# Patient Record
Sex: FEMALE | Race: WHITE | Employment: FULL TIME | URBAN - METROPOLITAN AREA
[De-identification: names, ages, dates, MRNs, and addresses within clinical notes are randomized per-mention and may not be internally consistent; named-entity substitution may affect disease eponyms.]

---

## 2022-03-18 PROBLEM — K57.90 DIVERTICULOSIS: Status: ACTIVE | Noted: 2019-11-15

## 2022-03-18 PROBLEM — Q79.60 EHLERS-DANLOS SYNDROME: Status: ACTIVE | Noted: 2019-11-15

## 2022-03-19 PROBLEM — E78.00 HIGH CHOLESTEROL: Status: ACTIVE | Noted: 2019-11-15

## 2022-03-20 PROBLEM — M16.12 OSTEOARTHRITIS OF LEFT HIP: Status: ACTIVE | Noted: 2019-11-15

## 2023-08-30 ENCOUNTER — HOSPITAL ENCOUNTER (OUTPATIENT)
Facility: HOSPITAL | Age: 58
Discharge: HOME | End: 2023-09-01
Payer: COMMERCIAL

## 2023-08-30 ENCOUNTER — HOSPITAL ENCOUNTER (OUTPATIENT)
Facility: HOSPITAL | Age: 58
Discharge: HOME | End: 2023-09-02

## 2023-08-30 LAB
EKG ATRIAL RATE: 51 BPM
EKG DIAGNOSIS: NORMAL
EKG P AXIS: 62 DEGREES
EKG P-R INTERVAL: 156 MS
EKG Q-T INTERVAL: 432 MS
EKG QRS DURATION: 82 MS
EKG QTC CALCULATION (BAZETT): 398 MS
EKG R AXIS: 74 DEGREES
EKG T AXIS: 71 DEGREES
EKG VENTRICULAR RATE: 51 BPM
SENTARA SPECIMEN COLLECTION: NORMAL

## 2023-08-30 PROCEDURE — 93005 ELECTROCARDIOGRAM TRACING: CPT | Performed by: ORTHOPAEDIC SURGERY

## 2023-09-08 PROBLEM — M16.11 PRIMARY OSTEOARTHRITIS OF RIGHT HIP: Chronic | Status: ACTIVE | Noted: 2023-09-08

## 2023-09-08 NOTE — H&P
veins.  EMOTIONAL:  Patient has no signs of anxiety, depression, bipolar disorder, memory loss or change in mood. Physical Exam:      There were no vitals taken for this visit. Physical Exam:   Physical exam shows a healthy-appearing, elderly white female. The right hip is tender at the greater trochanter and along the iliotibial band. In the MICAH position she gets pain that is similar to her complaints. The skin is normal with no contusions or wounds. There are no restrictions of hip mobility on internal and external rotation. The patient has normal light touch sensation in all dermatomal patterns. There is normal motor strength to heel and toe walking. There is negative straight leg raising bilaterally to 90 degrees in the seated position. The patient has good capillary refill. Assessment/Plan     Principal Problem:    Primary osteoarthritis of right hip  Resolved Problems:    * No resolved hospital problems. *     The patient is going to be scheduled for a right outpatient direct anterior hip arthroplasty using the Depuy Actis system under x-ray guidance. The risks including pain, bleeding, infection, fracture, deep vein thrombosis, pulmonary embolism were reviewed and questions were answered. The patient understands these risks and is willing to proceed. We have prescribed the patient Keflex for antibiotic prophylaxis and also a prescription for Roxicodone for postoperative pain management. We will use aspirin 81mg twice daily for DVT prophylaxis. The patient will also receive an IV dose of antibiotics preoperatively. The patient was counseled on the importance of ice and elevation. Home health PT & Nursing have been prescribed. The patient will follow up two weeks postoperatively. The patient was not issued a walker. Review of X-Rays show Kellgren-Ronnell grade 3/4 changes. We are going to do this at [facility].  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

## 2023-09-12 ENCOUNTER — ANESTHESIA EVENT (OUTPATIENT)
Facility: HOSPITAL | Age: 58
End: 2023-09-12
Payer: COMMERCIAL

## 2023-09-12 RX ORDER — SODIUM CHLORIDE 9 MG/ML
INJECTION, SOLUTION INTRAVENOUS PRN
Status: CANCELLED | OUTPATIENT
Start: 2023-09-12

## 2023-09-12 RX ORDER — SODIUM CHLORIDE 0.9 % (FLUSH) 0.9 %
5-40 SYRINGE (ML) INJECTION EVERY 12 HOURS SCHEDULED
Status: CANCELLED | OUTPATIENT
Start: 2023-09-12

## 2023-09-12 RX ORDER — SODIUM CHLORIDE 0.9 % (FLUSH) 0.9 %
5-40 SYRINGE (ML) INJECTION PRN
Status: CANCELLED | OUTPATIENT
Start: 2023-09-12

## 2023-09-13 ENCOUNTER — HOME HEALTH ADMISSION (OUTPATIENT)
Age: 58
End: 2023-09-13
Payer: COMMERCIAL

## 2023-09-13 ENCOUNTER — ANESTHESIA (OUTPATIENT)
Facility: HOSPITAL | Age: 58
End: 2023-09-13
Payer: COMMERCIAL

## 2023-09-13 ENCOUNTER — HOSPITAL ENCOUNTER (OUTPATIENT)
Facility: HOSPITAL | Age: 58
Setting detail: OUTPATIENT SURGERY
Discharge: HOME | End: 2023-09-13
Attending: ORTHOPAEDIC SURGERY | Admitting: ORTHOPAEDIC SURGERY
Payer: COMMERCIAL

## 2023-09-13 ENCOUNTER — APPOINTMENT (OUTPATIENT)
Facility: HOSPITAL | Age: 58
End: 2023-09-13
Attending: ORTHOPAEDIC SURGERY
Payer: COMMERCIAL

## 2023-09-13 VITALS
RESPIRATION RATE: 18 BRPM | TEMPERATURE: 96.8 F | HEIGHT: 70 IN | BODY MASS INDEX: 35.55 KG/M2 | WEIGHT: 248.3 LBS | DIASTOLIC BLOOD PRESSURE: 65 MMHG | SYSTOLIC BLOOD PRESSURE: 110 MMHG | OXYGEN SATURATION: 96 % | HEART RATE: 62 BPM

## 2023-09-13 PROBLEM — M16.11 PRIMARY OSTEOARTHRITIS OF RIGHT HIP: Chronic | Status: RESOLVED | Noted: 2023-09-08 | Resolved: 2023-09-13

## 2023-09-13 LAB
ABO + RH BLD: NORMAL
BLOOD GROUP ANTIBODIES SERPL: NORMAL
SPECIMEN EXP DATE BLD: NORMAL

## 2023-09-13 PROCEDURE — 6360000002 HC RX W HCPCS: Performed by: NURSE ANESTHETIST, CERTIFIED REGISTERED

## 2023-09-13 PROCEDURE — 2580000003 HC RX 258: Performed by: PHYSICIAN ASSISTANT

## 2023-09-13 PROCEDURE — 2580000003 HC RX 258: Performed by: ORTHOPAEDIC SURGERY

## 2023-09-13 PROCEDURE — 86901 BLOOD TYPING SEROLOGIC RH(D): CPT

## 2023-09-13 PROCEDURE — 7100000010 HC PHASE II RECOVERY - FIRST 15 MIN: Performed by: ORTHOPAEDIC SURGERY

## 2023-09-13 PROCEDURE — A4217 STERILE WATER/SALINE, 500 ML: HCPCS | Performed by: ORTHOPAEDIC SURGERY

## 2023-09-13 PROCEDURE — 2500000003 HC RX 250 WO HCPCS: Performed by: NURSE ANESTHETIST, CERTIFIED REGISTERED

## 2023-09-13 PROCEDURE — 6360000002 HC RX W HCPCS: Performed by: PHYSICIAN ASSISTANT

## 2023-09-13 PROCEDURE — 97165 OT EVAL LOW COMPLEX 30 MIN: CPT

## 2023-09-13 PROCEDURE — 97116 GAIT TRAINING THERAPY: CPT

## 2023-09-13 PROCEDURE — 7100000001 HC PACU RECOVERY - ADDTL 15 MIN: Performed by: ORTHOPAEDIC SURGERY

## 2023-09-13 PROCEDURE — 86900 BLOOD TYPING SEROLOGIC ABO: CPT

## 2023-09-13 PROCEDURE — 6370000000 HC RX 637 (ALT 250 FOR IP): Performed by: ANESTHESIOLOGY

## 2023-09-13 PROCEDURE — 6370000000 HC RX 637 (ALT 250 FOR IP): Performed by: PHYSICIAN ASSISTANT

## 2023-09-13 PROCEDURE — 97535 SELF CARE MNGMENT TRAINING: CPT

## 2023-09-13 PROCEDURE — 2500000003 HC RX 250 WO HCPCS: Performed by: ORTHOPAEDIC SURGERY

## 2023-09-13 PROCEDURE — 97161 PT EVAL LOW COMPLEX 20 MIN: CPT

## 2023-09-13 PROCEDURE — 3600000005 HC SURGERY LEVEL 5 BASE: Performed by: ORTHOPAEDIC SURGERY

## 2023-09-13 PROCEDURE — C1713 ANCHOR/SCREW BN/BN,TIS/BN: HCPCS | Performed by: ORTHOPAEDIC SURGERY

## 2023-09-13 PROCEDURE — 6370000000 HC RX 637 (ALT 250 FOR IP): Performed by: ORTHOPAEDIC SURGERY

## 2023-09-13 PROCEDURE — 36415 COLL VENOUS BLD VENIPUNCTURE: CPT

## 2023-09-13 PROCEDURE — 3700000001 HC ADD 15 MINUTES (ANESTHESIA): Performed by: ORTHOPAEDIC SURGERY

## 2023-09-13 PROCEDURE — C1776 JOINT DEVICE (IMPLANTABLE): HCPCS | Performed by: ORTHOPAEDIC SURGERY

## 2023-09-13 PROCEDURE — 86850 RBC ANTIBODY SCREEN: CPT

## 2023-09-13 PROCEDURE — 7100000011 HC PHASE II RECOVERY - ADDTL 15 MIN: Performed by: ORTHOPAEDIC SURGERY

## 2023-09-13 PROCEDURE — 7100000000 HC PACU RECOVERY - FIRST 15 MIN: Performed by: ORTHOPAEDIC SURGERY

## 2023-09-13 PROCEDURE — 3700000000 HC ANESTHESIA ATTENDED CARE: Performed by: ORTHOPAEDIC SURGERY

## 2023-09-13 PROCEDURE — 2709999900 HC NON-CHARGEABLE SUPPLY: Performed by: ORTHOPAEDIC SURGERY

## 2023-09-13 PROCEDURE — 6360000002 HC RX W HCPCS: Performed by: ORTHOPAEDIC SURGERY

## 2023-09-13 PROCEDURE — 3600000015 HC SURGERY LEVEL 5 ADDTL 15MIN: Performed by: ORTHOPAEDIC SURGERY

## 2023-09-13 DEVICE — CUP ACET DIA52MM HIP GRIPTION PRI CEMENTLESS FIX SECT SER: Type: IMPLANTABLE DEVICE | Site: HIP | Status: FUNCTIONAL

## 2023-09-13 DEVICE — LINER ACET OD52MM ID36MM HIP ALTRX PINN: Type: IMPLANTABLE DEVICE | Site: HIP | Status: FUNCTIONAL

## 2023-09-13 DEVICE — STEM FEM SZ 7 L109MM 12/14 TAPR STD OFFSET HIP DUOFIX CLLRD: Type: IMPLANTABLE DEVICE | Site: HIP | Status: FUNCTIONAL

## 2023-09-13 DEVICE — HEAD FEM DIA36MM +1.5MM OFFSET 12/14 TAPR HIP CERAMIC: Type: IMPLANTABLE DEVICE | Site: HIP | Status: FUNCTIONAL

## 2023-09-13 RX ORDER — IPRATROPIUM BROMIDE AND ALBUTEROL SULFATE 2.5; .5 MG/3ML; MG/3ML
1 SOLUTION RESPIRATORY (INHALATION)
Status: DISCONTINUED | OUTPATIENT
Start: 2023-09-13 | End: 2023-09-13 | Stop reason: HOSPADM

## 2023-09-13 RX ORDER — FENTANYL CITRATE 50 UG/ML
25 INJECTION, SOLUTION INTRAMUSCULAR; INTRAVENOUS EVERY 5 MIN PRN
Status: DISCONTINUED | OUTPATIENT
Start: 2023-09-13 | End: 2023-09-13 | Stop reason: HOSPADM

## 2023-09-13 RX ORDER — PANTOPRAZOLE SODIUM 40 MG/1
40 TABLET, DELAYED RELEASE ORAL ONCE
Status: COMPLETED | OUTPATIENT
Start: 2023-09-13 | End: 2023-09-13

## 2023-09-13 RX ORDER — LABETALOL HYDROCHLORIDE 5 MG/ML
10 INJECTION, SOLUTION INTRAVENOUS
Status: DISCONTINUED | OUTPATIENT
Start: 2023-09-13 | End: 2023-09-13 | Stop reason: HOSPADM

## 2023-09-13 RX ORDER — SODIUM CHLORIDE, SODIUM LACTATE, POTASSIUM CHLORIDE, CALCIUM CHLORIDE 600; 310; 30; 20 MG/100ML; MG/100ML; MG/100ML; MG/100ML
INJECTION, SOLUTION INTRAVENOUS CONTINUOUS
Status: DISCONTINUED | OUTPATIENT
Start: 2023-09-13 | End: 2023-09-13 | Stop reason: HOSPADM

## 2023-09-13 RX ORDER — LIDOCAINE HYDROCHLORIDE 20 MG/ML
INJECTION, SOLUTION EPIDURAL; INFILTRATION; INTRACAUDAL; PERINEURAL PRN
Status: DISCONTINUED | OUTPATIENT
Start: 2023-09-13 | End: 2023-09-13 | Stop reason: SDUPTHER

## 2023-09-13 RX ORDER — PROPOFOL 10 MG/ML
INJECTION, EMULSION INTRAVENOUS CONTINUOUS PRN
Status: DISCONTINUED | OUTPATIENT
Start: 2023-09-13 | End: 2023-09-13 | Stop reason: SDUPTHER

## 2023-09-13 RX ORDER — DIPHENHYDRAMINE HYDROCHLORIDE 50 MG/ML
12.5 INJECTION INTRAMUSCULAR; INTRAVENOUS
Status: DISCONTINUED | OUTPATIENT
Start: 2023-09-13 | End: 2023-09-13 | Stop reason: HOSPADM

## 2023-09-13 RX ORDER — SODIUM CHLORIDE 0.9 % (FLUSH) 0.9 %
5-40 SYRINGE (ML) INJECTION PRN
Status: DISCONTINUED | OUTPATIENT
Start: 2023-09-13 | End: 2023-09-13 | Stop reason: HOSPADM

## 2023-09-13 RX ORDER — OXYCODONE HYDROCHLORIDE 5 MG/1
5 TABLET ORAL
Status: COMPLETED | OUTPATIENT
Start: 2023-09-13 | End: 2023-09-13

## 2023-09-13 RX ORDER — ACETAMINOPHEN 500 MG
1000 TABLET ORAL ONCE
Status: COMPLETED | OUTPATIENT
Start: 2023-09-13 | End: 2023-09-13

## 2023-09-13 RX ORDER — SODIUM CHLORIDE 0.9 % (FLUSH) 0.9 %
5-40 SYRINGE (ML) INJECTION EVERY 12 HOURS SCHEDULED
Status: DISCONTINUED | OUTPATIENT
Start: 2023-09-13 | End: 2023-09-13 | Stop reason: HOSPADM

## 2023-09-13 RX ORDER — PROCHLORPERAZINE EDISYLATE 5 MG/ML
5 INJECTION INTRAMUSCULAR; INTRAVENOUS
Status: DISCONTINUED | OUTPATIENT
Start: 2023-09-13 | End: 2023-09-13 | Stop reason: HOSPADM

## 2023-09-13 RX ORDER — LIDOCAINE HYDROCHLORIDE 10 MG/ML
INJECTION, SOLUTION INFILTRATION; PERINEURAL PRN
Status: DISCONTINUED | OUTPATIENT
Start: 2023-09-13 | End: 2023-09-13 | Stop reason: SDUPTHER

## 2023-09-13 RX ORDER — ONDANSETRON 2 MG/ML
INJECTION INTRAMUSCULAR; INTRAVENOUS PRN
Status: DISCONTINUED | OUTPATIENT
Start: 2023-09-13 | End: 2023-09-13 | Stop reason: SDUPTHER

## 2023-09-13 RX ORDER — TRANEXAMIC ACID 650 MG/1
1950 TABLET ORAL
Status: DISCONTINUED | OUTPATIENT
Start: 2023-09-13 | End: 2023-09-13 | Stop reason: HOSPADM

## 2023-09-13 RX ORDER — MIDAZOLAM HYDROCHLORIDE 1 MG/ML
INJECTION INTRAMUSCULAR; INTRAVENOUS PRN
Status: DISCONTINUED | OUTPATIENT
Start: 2023-09-13 | End: 2023-09-13 | Stop reason: SDUPTHER

## 2023-09-13 RX ORDER — DEXAMETHASONE SODIUM PHOSPHATE 4 MG/ML
INJECTION, SOLUTION INTRA-ARTICULAR; INTRALESIONAL; INTRAMUSCULAR; INTRAVENOUS; SOFT TISSUE PRN
Status: DISCONTINUED | OUTPATIENT
Start: 2023-09-13 | End: 2023-09-13 | Stop reason: SDUPTHER

## 2023-09-13 RX ORDER — ONDANSETRON 2 MG/ML
4 INJECTION INTRAMUSCULAR; INTRAVENOUS
Status: DISCONTINUED | OUTPATIENT
Start: 2023-09-13 | End: 2023-09-13 | Stop reason: HOSPADM

## 2023-09-13 RX ORDER — DEXAMETHASONE SODIUM PHOSPHATE 4 MG/ML
8 INJECTION, SOLUTION INTRA-ARTICULAR; INTRALESIONAL; INTRAMUSCULAR; INTRAVENOUS; SOFT TISSUE ONCE
Status: COMPLETED | OUTPATIENT
Start: 2023-09-13 | End: 2023-09-13

## 2023-09-13 RX ORDER — SODIUM CHLORIDE 9 MG/ML
INJECTION, SOLUTION INTRAVENOUS PRN
Status: DISCONTINUED | OUTPATIENT
Start: 2023-09-13 | End: 2023-09-13 | Stop reason: HOSPADM

## 2023-09-13 RX ORDER — HYDROMORPHONE HYDROCHLORIDE 1 MG/ML
0.5 INJECTION, SOLUTION INTRAMUSCULAR; INTRAVENOUS; SUBCUTANEOUS EVERY 5 MIN PRN
Status: DISCONTINUED | OUTPATIENT
Start: 2023-09-13 | End: 2023-09-13 | Stop reason: HOSPADM

## 2023-09-13 RX ADMIN — SODIUM CHLORIDE, SODIUM LACTATE, POTASSIUM CHLORIDE, AND CALCIUM CHLORIDE: 600; 310; 30; 20 INJECTION, SOLUTION INTRAVENOUS at 11:47

## 2023-09-13 RX ADMIN — MEPIVACAINE HYDROCHLORIDE 3.4 ML: 15 INJECTION, SOLUTION EPIDURAL; INFILTRATION at 13:49

## 2023-09-13 RX ADMIN — PANTOPRAZOLE SODIUM 40 MG: 40 TABLET, DELAYED RELEASE ORAL at 11:34

## 2023-09-13 RX ADMIN — LIDOCAINE HYDROCHLORIDE 3 ML: 10 INJECTION, SOLUTION INFILTRATION; PERINEURAL at 13:48

## 2023-09-13 RX ADMIN — DEXAMETHASONE SODIUM PHOSPHATE 4 MG: 4 INJECTION, SOLUTION INTRAMUSCULAR; INTRAVENOUS at 14:01

## 2023-09-13 RX ADMIN — SODIUM CHLORIDE, SODIUM LACTATE, POTASSIUM CHLORIDE, AND CALCIUM CHLORIDE: 600; 310; 30; 20 INJECTION, SOLUTION INTRAVENOUS at 15:06

## 2023-09-13 RX ADMIN — TRANEXAMIC ACID 1950 MG: 650 TABLET ORAL at 11:34

## 2023-09-13 RX ADMIN — SODIUM CHLORIDE, POTASSIUM CHLORIDE, SODIUM LACTATE AND CALCIUM CHLORIDE 1000 ML: 600; 310; 30; 20 INJECTION, SOLUTION INTRAVENOUS at 11:47

## 2023-09-13 RX ADMIN — DEXAMETHASONE SODIUM PHOSPHATE 8 MG: 4 INJECTION INTRA-ARTICULAR; INTRALESIONAL; INTRAMUSCULAR; INTRAVENOUS; SOFT TISSUE at 11:48

## 2023-09-13 RX ADMIN — PROPOFOL 100 MCG/KG/MIN: 10 INJECTION, EMULSION INTRAVENOUS at 13:57

## 2023-09-13 RX ADMIN — PROPOFOL 70 MG: 10 INJECTION, EMULSION INTRAVENOUS at 14:15

## 2023-09-13 RX ADMIN — ACETAMINOPHEN 1000 MG: 500 TABLET ORAL at 11:35

## 2023-09-13 RX ADMIN — MIDAZOLAM 2 MG: 1 INJECTION INTRAMUSCULAR; INTRAVENOUS at 13:39

## 2023-09-13 RX ADMIN — WATER 2000 MG: 1 INJECTION INTRAMUSCULAR; INTRAVENOUS; SUBCUTANEOUS at 13:57

## 2023-09-13 RX ADMIN — LIDOCAINE HYDROCHLORIDE 40 MG: 20 INJECTION, SOLUTION EPIDURAL; INFILTRATION; INTRACAUDAL; PERINEURAL at 14:01

## 2023-09-13 RX ADMIN — OXYCODONE HYDROCHLORIDE 5 MG: 5 TABLET ORAL at 18:19

## 2023-09-13 RX ADMIN — ONDANSETRON 4 MG: 2 INJECTION INTRAMUSCULAR; INTRAVENOUS at 14:01

## 2023-09-13 ASSESSMENT — PAIN SCALES - GENERAL
PAINLEVEL_OUTOF10: 7
PAINLEVEL_OUTOF10: 7
PAINLEVEL_OUTOF10: 4

## 2023-09-13 ASSESSMENT — PAIN DESCRIPTION - ORIENTATION
ORIENTATION: RIGHT;LEFT
ORIENTATION: RIGHT

## 2023-09-13 ASSESSMENT — PAIN DESCRIPTION - LOCATION
LOCATION: HIP
LOCATION: BACK
LOCATION: HIP;FOOT

## 2023-09-13 ASSESSMENT — PAIN DESCRIPTION - DESCRIPTORS: DESCRIPTORS: CRAMPING

## 2023-09-13 NOTE — PERIOP NOTE
TRANSFER - IN REPORT:    Verbal report received from [address] on   being received from Phase II for routine post-op      Report consisted of patient's Situation, Background, Assessment and   Recommendations(SBAR). Information from the following report(s) Nurse Handoff Report, Surgery Report, Intake/Output, MAR, and Recent Results was reviewed with the receiving nurse. Opportunity for questions and clarification was provided. Assessment completed upon patient's arrival to unit and care assumed.

## 2023-09-13 NOTE — PERIOP NOTE
TRANSFER - IN REPORT:    Verbal report received from [facility] on   being received from PACU for routine post-op      Report consisted of patient's Situation, Background, Assessment and   Recommendations(SBAR). Information from the following report(s) Nurse Handoff Report, Surgery Report, Intake/Output, and MAR was reviewed with the receiving nurse. Opportunity for questions and clarification was provided. Assessment completed upon patient's arrival to unit and care assumed.

## 2023-09-13 NOTE — PERIOP NOTE
TRANSFER - IN REPORT:    Verbal report received from OR RN on [address]  being received from OR for routine progression of patient care      Report consisted of patient's Situation, Background, Assessment and   Recommendations(SBAR). Information from the following report(s) Adult Overview, Surgery Report, Intake/Output, and MAR was reviewed with the receiving nurse. Opportunity for questions and clarification was provided. Assessment completed upon patient's arrival to unit and care assumed.

## 2023-09-13 NOTE — OP NOTE
RIGHT DIRECT ANTERIOR TOTAL HIP REPLACEMENT    Patient: Ariane Rivera MRN: [id number]  CSN: [id number]   YOB: 1965  Age: 63 y.o. Sex: female      Date of Surgery:9/13/2023     PREOPERATIVE DIAGNOSES: Right hip osteoarthritis      POSTOPERATIVE DIAGNOSES:Right hip osteoarthritis    PROCEDURE: Right press fit direct anterior total hip arthroplasty. IMPLANTS:   Implant Name Type Inv. Item Serial No.  Lot No. LRB No. Used Action   CUP ACET [id number] HIP GRIPTION MARITA CEMENTLESS FIX SECT SER - [id number]  CUP ACET [id number] HIP GRIPTION MARITA CEMENTLESS FIX SECT SER  [facility] [id number] Right 1 Implanted   LINER ACET OD52MM ID36MM HIP ALTRX PINN - [id number]  LINER ACET OD52MM ID36MM HIP ALTRX PINN  [facility] [id number] Right 1 Implanted   STEM FEM SZ 7 L109MM 12/14 TAPR STD OFFSET HIP DUOFIX CLLRD - [id number]  STEM FEM SZ 7 L109MM 12/14 TAPR STD OFFSET HIP DUOFIX CLLRD  [facility] [id number] Right 1 Implanted   HEAD FEM [id number] +1.5MM OFFSET 12/14 TAPR HIP CERAMIC - [id number]  HEAD FEM [id number] +1.5MM OFFSET 12/14 TAPR HIP CERAMIC  [facility] [id number] Right 1 Implanted       SURGEON: VINCENT Richard MD    FIRST ASSISTANT: Mert Rhodes PA-C    SECOND ASSISTANT: Mel Scrub: Jaime Fregoso RN  Scrub Person First: [facility]  Scrub Person Second: Lynnette Mcgrath RN; Darya Sparrow  Physician Assistant: Mert Rhodes PA-C    ANESTHESIA: Other    SPECIMENS TO PATHOLOGY: * No specimens in log *    ESTIMATED BLOOD LOSS: 75cc    FINDINGS: Same    COMPLICATIONS: None    INDICATIONS:  A 63 y.o. White (non-) female with severe coxarthrosis documented by clinical exam and x-ray. The patient has bone-on-bone arthritis by x-rays and has failed all conservative interventions including medications, weight loss, physical therapy, relative rest, ambulatory aids and injections.   The patient now

## 2023-09-13 NOTE — PERIOP NOTE
Reviewed PTA medication list with patient/caregiver and patient/caregiver denies any additional medications. Patient admits to having a responsible adult care for them at home for at least 24 hours after surgery. Patient encouraged to use gown warming system and informed that using said warming gown to regulate body temperature prior to a procedure has been shown to help reduce the risks of blood clots and infection. Patient's pharmacy of choice verified and documented in PTA medication section. Dual skin assessment & fall risk band verification completed with VLADIMIR Julio RN.

## 2023-09-13 NOTE — INTERVAL H&P NOTE
Update History & Physical    The patient's History and Physical of September 8, the surgery was reviewed with the patient and I examined the patient. There was no change. The surgical site was confirmed by the patient and me. Plan: The risks, benefits, expected outcome, and alternative to the recommended procedure have been discussed with the patient. Patient understands and wants to proceed with the procedure.      Electronically signed by Justin Steinberg MD on 9/13/2023 at 12:35 PM

## 2023-09-13 NOTE — PERIOP NOTE
TRANSFER - OUT REPORT:    Verbal report given to Tiara HILARIO on BUZZ  being transferred to [facility] for routine progression of patient care       Report consisted of patient's Situation, Background, Assessment and   Recommendations(SBAR). Information from the following report(s) Adult Overview, Surgery Report, Intake/Output, and MAR was reviewed with the receiving nurse. Lines:   Peripheral IV 09/13/23 Posterior;Right Hand (Active)   Site Assessment Clean, dry & intact 09/13/23 1547   Line Status Infusing 09/13/23 1547   Phlebitis Assessment No symptoms 09/13/23 1547   Infiltration Assessment 0 09/13/23 1547   Alcohol Cap Used No 09/13/23 1146   Dressing Status Clean, dry & intact 09/13/23 1547   Dressing Type Transparent 09/13/23 1547   Dressing Intervention New 09/13/23 1146        Opportunity for questions and clarification was provided.       Patient transported with:  Registered Nurse

## 2023-09-13 NOTE — ANESTHESIA PROCEDURE NOTES
Spinal Block    Patient location during procedure: OR  End time: 9/13/2023 1:49 PM  Reason for block: primary anesthetic  Staffing  Performed: resident/CRNA   Resident/CRNA: FATEMEH Montiel CRNA  Performed by: FATEMEH Montiel CRNA  Authorized by: Ganesh Benjamin,     Spinal Block  Patient position: sitting  Prep: ChloraPrep  Patient monitoring: continuous pulse ox and frequent blood pressure checks  Approach: midline  Location: L3/L4  Provider prep: mask and sterile gloves  Needle  Needle type:  Attila   Needle gauge: 25 G  Needle length: 3.5 in  Assessment  Events: cerebrospinal fluid  Swirl obtained: Yes  CSF: clear  Attempts: 1  Hemodynamics: stable  Preanesthetic Checklist  Completed: patient identified, IV checked, site marked, risks and benefits discussed, surgical/procedural consents, equipment checked, pre-op evaluation, timeout performed, anesthesia consent given, oxygen available, monitors applied/VS acknowledged, fire risk safety assessment completed and verbalized and blood product R/B/A discussed and consented

## 2023-09-13 NOTE — PERIOP NOTE
TRANSFER - OUT REPORT:    Verbal report given to Farhan Talley RN on   being transferred to PACU for routine post-op       Report consisted of patient's Situation, Background, Assessment and   Recommendations(SBAR). Information from the following report(s) Nurse Handoff Report, Surgery Report, Intake/Output, and MAR was reviewed with the receiving nurse. Lines:   Peripheral IV 09/13/23 Posterior;Right Hand (Active)   Site Assessment Clean, dry & intact 09/13/23 1559   Line Status Infusing 09/13/23 1559   Phlebitis Assessment No symptoms 09/13/23 1559   Infiltration Assessment 0 09/13/23 1559   Alcohol Cap Used No 09/13/23 1146   Dressing Status Clean, dry & intact 09/13/23 1559   Dressing Type Transparent 09/13/23 1559   Dressing Intervention New 09/13/23 1146        Opportunity for questions and clarification was provided.       Patient transported with:  Registered Nurse

## 2023-09-14 ENCOUNTER — HOME CARE VISIT (OUTPATIENT)
Age: 58
End: 2023-09-14
Payer: COMMERCIAL

## 2023-09-14 VITALS
TEMPERATURE: 45.3 F | DIASTOLIC BLOOD PRESSURE: 60 MMHG | SYSTOLIC BLOOD PRESSURE: 104 MMHG | HEIGHT: 70 IN | RESPIRATION RATE: 20 BRPM | BODY MASS INDEX: 35.5 KG/M2 | OXYGEN SATURATION: 98 % | WEIGHT: 248 LBS | HEART RATE: 53 BPM

## 2023-09-14 PROCEDURE — G0299 HHS/HOSPICE OF RN EA 15 MIN: HCPCS

## 2023-09-15 ENCOUNTER — HOME CARE VISIT (OUTPATIENT)
Age: 58
End: 2023-09-15
Payer: COMMERCIAL

## 2023-09-15 VITALS
DIASTOLIC BLOOD PRESSURE: 82 MMHG | SYSTOLIC BLOOD PRESSURE: 124 MMHG | HEART RATE: 59 BPM | RESPIRATION RATE: 16 BRPM | OXYGEN SATURATION: 92 % | TEMPERATURE: 98.2 F

## 2023-09-15 VITALS
OXYGEN SATURATION: 96 % | TEMPERATURE: 97.7 F | DIASTOLIC BLOOD PRESSURE: 72 MMHG | SYSTOLIC BLOOD PRESSURE: 118 MMHG | RESPIRATION RATE: 16 BRPM | HEART RATE: 60 BPM

## 2023-09-15 PROCEDURE — G0151 HHCP-SERV OF PT,EA 15 MIN: HCPCS

## 2023-09-15 PROCEDURE — G0152 HHCP-SERV OF OT,EA 15 MIN: HCPCS

## 2023-09-15 ASSESSMENT — ENCOUNTER SYMPTOMS
PAIN LOCATION - PAIN QUALITY: ACHING
STOOL DESCRIPTION: FORMED
DYSPNEA ACTIVITY LEVEL: AFTER AMBULATING 10 - 20 FT

## 2023-09-15 NOTE — HOME HEALTH
Skilled reason for visit: body systems assess, teach disease process and medication management    Caregiver involvement: family assists with adl's and iadl's prn. Medications reviewed and all medications are available in the home this visit. The following education was provided regarding medications: take medications as ordered, side effects, dosages, purposes, frequencies, do not stop or start any medications without notifying MD.    MD notified of any discrepancies/look a-like medications/medication interactions: n/a  Medications are effective at this time. Home health supplies by type and quantity ordered/delivered this visit include: n/a    Patient education provided this visit: as above, keep R THR wound dressing dry and intact, INSTRUCTED PATIENT AND CG THAT SHOULD ANY NEEDS OR CONCERNS ARISE TO FIRST CALL OUR OFFICE, OR THE DR'S OFFICE OR GO TO AN URGENT CARE CENTER AND NOT TO THE ED FOR NON-LIFE THREATENING EVENTS. IF IT IS LIFE THREATENING THEN CALL 911 OR GO TO THE CLOSEST ER, see care plan. Sharps education provided: n/a    Patient level of understanding of education provided: partial understanding and teach back    Skilled Care Performed this visit: VS, body systems assessment, teach disease process and medication management, wound assess    Patient response to procedure performed: receptive to teaching, tolerated assess and teaching without adverse effects noted. Agency Progress toward goals: progressing, see care plan    Patient's Progress towards personal goals: progressing, see care plan, patient given opportunity to voice questions and/ or concerns. Patient states has no questions or concerns this visit. Home exercise program: take medications as ordered, follow ordered diet, use assistive devices as instructed    Continued need for the following skills: Nursing.     Plan for next visit: body systems assess, teach disease process and medication management    Patient and/or caregiver

## 2023-09-15 NOTE — HOME HEALTH
abduction 3+/5, hip adduction 3+/5, Knee flexion 4/5  knee extension 4/5, ankle dorsiflexion 4/5  Right Hip flexion 3-/5 , hip abduction 3-/5, hip adduction 3-/5, Knee flexion 3+/5  knee extension 3+/5, ankle dorsiflexion 4-/5  BLE ROM:  Right hip/knee/ankle: WFL  Left hip/knee/ankle: WFL  Bed mobility:  min A   Transfers:  min A with sit<->stand for bed to chair, with FWW AD. min cues and instruction needed for safety and sequencing  GAIT:  Patient ambulated  100 ft  with FWW  on level  surfaces min A. Pt demonstrates with decreased hip and knee flexion on RLE in pre and mid swing phase of gait as well as decreased stride-length and kd. Patient is unable to safely ambulate without assistance at this time. Pt required min cues for  safety and sequencing  Stairs: 13 stairs with min/CGA and hand rail/AD  Patient education provided this visit: Safety with functional mobility and instruction with HEP. Patient level of understanding of education provided: good ,able to return demonstrate mobility instruction and HEP with min assistance  Patient response to treatment:  good with no c/o increased pain  Instructed patient with regards to signs and symptoms of infection. WOUND: SN is managing the right hip surgical incision  Assessment: Referral for HHPT following recent hospitalization due to right hip . HHPT is medically necessary to address the following clinical findings: decreased BLE strength and ROM, impaired gait, decreased I and safety with transfers and gait, decreased endurance, decreased balance and decreased safety in order to improve functional mobility and decrease fall risk. Pt is a fall risk as indicated by Tinetti score of 14/28.   Patient will be seen for HHPT 2w1, 3w1, 2w1 for therapeutic exercises to increase BLE strength and ROM,  training for gait, stairs and transfers to increase I and safety with daily functional mobility and balance and endurance activities to decrease fall risk,

## 2023-09-16 ENCOUNTER — HOME CARE VISIT (OUTPATIENT)
Age: 58
End: 2023-09-16
Payer: COMMERCIAL

## 2023-09-16 PROCEDURE — G0157 HHC PT ASSISTANT EA 15: HCPCS

## 2023-09-18 ENCOUNTER — HOME CARE VISIT (OUTPATIENT)
Age: 58
End: 2023-09-18
Payer: COMMERCIAL

## 2023-09-18 VITALS
DIASTOLIC BLOOD PRESSURE: 84 MMHG | RESPIRATION RATE: 15 BRPM | TEMPERATURE: 99.1 F | HEART RATE: 68 BPM | OXYGEN SATURATION: 96 % | SYSTOLIC BLOOD PRESSURE: 126 MMHG

## 2023-09-18 VITALS
SYSTOLIC BLOOD PRESSURE: 130 MMHG | DIASTOLIC BLOOD PRESSURE: 72 MMHG | RESPIRATION RATE: 16 BRPM | OXYGEN SATURATION: 96 % | HEART RATE: 64 BPM | TEMPERATURE: 97.8 F

## 2023-09-18 PROCEDURE — G0157 HHC PT ASSISTANT EA 15: HCPCS

## 2023-09-18 ASSESSMENT — ENCOUNTER SYMPTOMS
PAIN LOCATION - PAIN QUALITY: SORENESS, TIGHTNESS
PAIN LOCATION - PAIN QUALITY: THROBBING

## 2023-09-19 ENCOUNTER — HOME CARE VISIT (OUTPATIENT)
Age: 58
End: 2023-09-19
Payer: COMMERCIAL

## 2023-09-19 PROCEDURE — G0299 HHS/HOSPICE OF RN EA 15 MIN: HCPCS

## 2023-09-19 NOTE — HOME HEALTH
SUBJECTIVE:  Patient reports continued R hip pain at this time. DATE OF SURGERY: 9/13/23. PAIN: see pain assessment    OBJECTIVE: see interventions    NEXT MD APPT: NABIL. CAREGIVER ASSISTANCE NEEDED FOR: Caregiver needed for ADLs, IADLs, gait, transfers, stair navigation and transportation to/from medical appointments. ASSESSMENT AND PROGRESS TOWARD GOALS:  Patient demonstrated a good result to therapy this date as evidenced by patient progression of gait x 150' indoors RW S for safety with therapist cues for postural correction. Patient able to correct and demo post.  Patient progressing transfers with return demo appropriate body mechanics S for safety. Patient progressing stair navigation with return demo up/down full flight BHR step to pattern S for safety at this time. PATIENT RESPONSE TO TREATMENT:  Consistent pain throughout session. PATIENT LEVEL OF UNDERSTANDING OF EDUCATION: Correction and demo improvements to gait pattern, return demo appropriate body mechanics for stair navigation & commode transfers. CONTINUED NEED FOR THE FOLLOWING SKILLS: HH PT is medically necessary to address right hip pain, decreased ROM, decreased strength, increased swelling, impaired bed mobility, decreased independence with functional transfers, impaired gait, impaired stair negotiation, and impaired balance in order to improve functional independence, quality of life, return to PLOF, reduce the risk for falls, and reduce pain. PLAN: Possible transition to [address] [place] [address]: Patient to continue HHPT 3w1, 2w1 at this time.

## 2023-09-19 NOTE — HOME HEALTH
Subjective: Patient relays that she has been having continued low back pain, but she has been able to navigate the stairs and do well overall with getting around the home. She denies any recent falls when asked. Objective: Skilled home health physical therapy interventions completed today listed in care plan section. Interventions performed today to assist in return to prior level of function, address deficits as apparent upon time of initial evaluation, return to community and personal activities, and progress further towards goals as previously established in plan of care. There are not any changes to medications upon timing of this visit. Home health supplies were note ordered/delivered today. Assessment:  Patient is progressing towards goals as previously established in POC with skilled home health physical therapy services at this time as made apparent by ability to display improved gait form after training for corrections. Challenged by standing exercises due to difficulty. She was able to display good understanding of need for slow uniplanar movements during transfers to allow for easier spinal stabilization and to reduce pain overall. Family/caregiver spouse involvement is present/set-up at this point in time and assists with meals, transport, and general encouragement. Plan:  Discharge planning discussed at this visit regarding eventual discharge once patient has met goals and/or met maximum benefit from home health skilled PT services with patient understanding and agreeable at this time. Continued need for skilled home health PT at this time to address deficits, reduce risk of falls, and obtain goals as previously established per plan of care. Further gait distance possibly outside as weather permits.

## 2023-09-20 ENCOUNTER — HOME CARE VISIT (OUTPATIENT)
Age: 58
End: 2023-09-20
Payer: COMMERCIAL

## 2023-09-20 VITALS — HEART RATE: 60 BPM | OXYGEN SATURATION: 96 % | RESPIRATION RATE: 18 BRPM | TEMPERATURE: 98.1 F

## 2023-09-20 VITALS
SYSTOLIC BLOOD PRESSURE: 132 MMHG | OXYGEN SATURATION: 96 % | HEART RATE: 72 BPM | TEMPERATURE: 97.4 F | DIASTOLIC BLOOD PRESSURE: 78 MMHG | RESPIRATION RATE: 16 BRPM

## 2023-09-20 PROCEDURE — G0157 HHC PT ASSISTANT EA 15: HCPCS

## 2023-09-20 ASSESSMENT — ENCOUNTER SYMPTOMS
PAIN LOCATION - PAIN QUALITY: ACHY
PAIN LOCATION - PAIN QUALITY: SHARP, ACHING

## 2023-09-21 NOTE — HOME HEALTH
SUBJECTIVE:  Patient reports continued R hip pain with increased fatigue secondary to busy morning. DATE OF SURGERY: 9/13/23. PAIN: see pain assessment    OBJECTIVE: see interventions    NEXT MD APPT: NABIL. CAREGIVER ASSISTANCE NEEDED FOR: Caregiver needed for ADLs, IADLs, gait, transfers, stair navigation and transportation to/from medical appointments. ASSESSMENT AND PROGRESS TOWARD GOALS:  Patient demonstrated a good result to therapy this date as evidenced by progression of transfers with return demo appropriate body mechanics for car transfers S for safety. Patient progressing gait x 300' outdoor/uneven terrain RW S for safety with patient ability to negotiate up/down curb with proper safety. Noted fatigue towards end of gait with decreased L step height. Patient reporting fatigue with request for discontinuation. PATIENT RESPONSE TO TREATMENT:  Increasd fatigue post gait. PATIENT LEVEL OF UNDERSTANDING OF EDUCATION: Return demo appropriate body mechanics for car transfers. CONTINUED NEED FOR THE FOLLOWING SKILLS: HH PT is medically necessary to address right hip pain, decreased ROM, decreased strength, increased swelling, impaired bed mobility, decreased independence with functional transfers, impaired gait, impaired stair negotiation, and impaired balance in order to improve functional independence, quality of life, return to PLOF, reduce the risk for falls, and reduce pain. PLAN: Possible transition to [facility], continued outdoor gait. DISCHARGE PLANNING DISCUSSED: Patient to continue HHPT 3w1, 2w1 at this time.

## 2023-09-22 ENCOUNTER — HOME CARE VISIT (OUTPATIENT)
Age: 58
End: 2023-09-22
Payer: COMMERCIAL

## 2023-09-22 VITALS
SYSTOLIC BLOOD PRESSURE: 140 MMHG | DIASTOLIC BLOOD PRESSURE: 78 MMHG | TEMPERATURE: 97.7 F | RESPIRATION RATE: 16 BRPM | OXYGEN SATURATION: 99 % | HEART RATE: 72 BPM

## 2023-09-22 PROCEDURE — G0157 HHC PT ASSISTANT EA 15: HCPCS

## 2023-09-22 ASSESSMENT — ENCOUNTER SYMPTOMS: PAIN LOCATION - PAIN QUALITY: DULL, THROBBING

## 2023-09-22 NOTE — HOME HEALTH
SUBJECTIVE:  Patient reports slight pain secondary to busy morning. DATE OF SURGERY: 9/13/23. PAIN: see pain assessment    OBJECTIVE: see interventions    NEXT MD APPT: NABIL. CAREGIVER ASSISTANCE NEEDED FOR: Caregiver needed for ADLs, IADLs, gait, transfers, stair navigation and transportation to/from medical appointments. ASSESSMENT AND PROGRESS TOWARD GOALS:  Patient demonstrated a good result to therapy this date as evidenced by patient ability to return demo two point gait pattern with SPC x 200' indoors S for safety. Patient able to teach back and demo HEP exercises in standing this date for improved strengthening, endurance and improvments to functional mobility. Patient progressing transfers with return demo appropriate body mechanics for shower transfers S for safety. PATIENT RESPONSE TO TREATMENT:  Improved activity tolerance. PATIENT LEVEL OF UNDERSTANDING OF EDUCATION: Return demo appropriate two point gait pattern, teach back and demo HEP exercises in standing, return demo appropriate body mechanics for shower transfers. CONTINUED NEED FOR THE FOLLOWING SKILLS: HH PT is medically necessary to address right hip pain, decreased ROM, decreased strength, increased swelling, impaired bed mobility, decreased independence with functional transfers, impaired gait, impaired stair negotiation, and impaired balance in order to improve functional independence, quality of life, return to PLOF, reduce the risk for falls, and reduce pain. PLAN: Tinetti Balance Assessment, MMT, TUG, 5x STS. DISCHARGE PLANNING DISCUSSED: Patient to continue HHPT 2w1 at this time.

## 2023-09-25 ENCOUNTER — HOME CARE VISIT (OUTPATIENT)
Age: 58
End: 2023-09-25
Payer: COMMERCIAL

## 2023-09-25 VITALS
TEMPERATURE: 97.3 F | DIASTOLIC BLOOD PRESSURE: 74 MMHG | HEART RATE: 69 BPM | OXYGEN SATURATION: 8 % | SYSTOLIC BLOOD PRESSURE: 138 MMHG | RESPIRATION RATE: 16 BRPM

## 2023-09-25 PROCEDURE — G0157 HHC PT ASSISTANT EA 15: HCPCS

## 2023-09-25 ASSESSMENT — ENCOUNTER SYMPTOMS: PAIN LOCATION - PAIN QUALITY: SHARP, THROBBING

## 2023-09-25 NOTE — HOME HEALTH
SUBJECTIVE:  Patient reports pain secondary to busy day day prior. DATE OF SURGERY: 9/13/23. PAIN: see pain assessment    OBJECTIVE: see interventions    NEXT MD APPT: NABIL. CAREGIVER ASSISTANCE NEEDED FOR: Caregiver needed for ADLs, IADLs, gait, transfers, stair navigation and transportation to/from medical appointments. Assessment and Summary of Care:  Patient's current functional status before discharge is as follows  Strength:   R hip flex 4/5  R hip abd 4/5  R hip add 4/5  R hip ext 4/5  R knee flex 4+/5  R knee ext 4+/5  R ankle DF 5/5  L hip flex 4+/5  L hip abd 4+/5  L hip ext 4+/5  L hip add 4+/5  L knee flex 4+/5  L knee ext 4+/5  L ankle DF 4+/5  ROM:  N/A. Bed Mobility: Mod I. Transfers: SPT, Shower, Commode & Car S/Mod I.     Gait/WC mobility: 300' SPC two point gait pattern indoor & outdoor/uneven terrain S for safety. Stairs: Up/down full flight BHR step to pattern S/Mod I. Special Tests: Tinetti Balance Assessment 19/28, TUG x 15 seconds, 5x STS x 14 seconds. Recommendations: Outpatient PT for continued strengthening, endurance & ROM. PATIENT RESPONSE TO TREATMENT:  Tinetti Balance Assessment 19/28, TUG x 15 seconds, 5x STS x 14 seconds. PATIENT LEVEL OF UNDERSTANDING OF EDUCATION: Verbal acknowledgement of safety and fall prevention strategies. CONTINUED NEED FOR THE FOLLOWING SKILLS: HH PT is medically necessary to address right hip pain, decreased ROM, decreased strength, increased swelling, impaired bed mobility, decreased independence with functional transfers, impaired gait, impaired stair negotiation, and impaired balance in order to improve functional independence, quality of life, return to PLOF, reduce the risk for falls, and reduce pain. PLAN: Review of safety with probable DC. DISCHARGE PLANNING DISCUSSED: Patient to continue HHPT 2w1 at this time.

## 2023-09-26 ENCOUNTER — HOME CARE VISIT (OUTPATIENT)
Age: 58
End: 2023-09-26
Payer: COMMERCIAL

## 2023-09-27 ENCOUNTER — HOME CARE VISIT (OUTPATIENT)
Age: 58
End: 2023-09-27
Payer: COMMERCIAL

## 2023-09-27 VITALS
DIASTOLIC BLOOD PRESSURE: 68 MMHG | OXYGEN SATURATION: 97 % | RESPIRATION RATE: 18 BRPM | TEMPERATURE: 97.8 F | SYSTOLIC BLOOD PRESSURE: 124 MMHG | HEART RATE: 60 BPM

## 2023-09-27 VITALS
HEART RATE: 62 BPM | TEMPERATURE: 98.4 F | DIASTOLIC BLOOD PRESSURE: 78 MMHG | SYSTOLIC BLOOD PRESSURE: 128 MMHG | OXYGEN SATURATION: 96 % | RESPIRATION RATE: 16 BRPM

## 2023-09-27 PROCEDURE — G0299 HHS/HOSPICE OF RN EA 15 MIN: HCPCS

## 2023-09-27 PROCEDURE — G0151 HHCP-SERV OF PT,EA 15 MIN: HCPCS

## 2023-09-27 ASSESSMENT — ENCOUNTER SYMPTOMS: PAIN LOCATION - PAIN QUALITY: ACHING

## 2023-09-27 NOTE — HOME HEALTH
DC ACTIONS NARRATIVE   Pt. clinically discharged and documentation finalized for completion of PT discharge. Caregiver involvement: Pt  is primary caregiver at this time and has been assisting with ADL's and medical appointments. Medications reconciled and all medications are available in the home this visit. The following education was provided regarding medications, medication interactions, and look a like medications: NA Medications are effective at this time. Home health supplies by type and quantity ordered/delivered this visit include: NA  Patient education provided this visit: safety with functional mobility  Current Functional Status and progress towards goals:   Strength: Pt. RLE strength is 4-/5 which is an improvement from the initial evaluation strength of 3-/5. This allows the patient increased functional independence and mobility  BED MOBILITY: Pt. is independent with all bed mobility which demonstrates an improvement from the initial evaluation of min A. This allows for the patient to be functionally more independent. TRANSFERS: Pt. is independent with all transfers with no AD which demonstrates an improvement from the initial evaluation score of min A with FWW AD. This allows the patient increased functional independence and mobility   GAIT/WC MOBILITY: Pt. is able to ambulate >300 feet inside over even and outside over uneven surfaces with mod I with SPC AD. This represents an improvement from the initial evaluation of 100 feet with FWW AD on level surfaces with min A. STAIRS: 12+ stairs independently   BALANCE: Patient's final Tinetti is 23/28 which is an improvement from the initial evaluation score of 14/28. This allows the patient to be functionally more independent and have a decreased fall risk   Progress toward goals: Patient has met  goals, see interventions for details. Pt. was able to return demonstrate all mobility training and HEP shown independently.  Patient response

## (undated) DEVICE — HYPODERMIC SAFETY NEEDLE: Brand: MAGELLAN

## (undated) DEVICE — HANDPIECE SET WITH HIGH FLOW TIP AND SUCTION TUBE: Brand: INTERPULSE

## (undated) DEVICE — GARMENT,MEDLINE,DVT,INT,CALF,MED, GEN2: Brand: MEDLINE

## (undated) DEVICE — SUTURE VCRL + SZ 2-0 L36IN ABSRB UD L36MM CT-1 1/2 CIR VCP945H

## (undated) DEVICE — PACK PROCEDURE SURG ANTR HIP

## (undated) DEVICE — STRYKER PERFORMANCE SERIES SAGITTAL BLADE: Brand: STRYKER PERFORMANCE SERIES

## (undated) DEVICE — SYRINGE 20ML LL S/C 50

## (undated) DEVICE — SOLUTION IV 250ML 0.9% SOD CHL CLR INJ FLX BG CONT PRT CLSR

## (undated) DEVICE — DRESSING ALG W4XL8IN AG FOAM SUPERABSORBENT SIL ANTIMIC

## (undated) DEVICE — SOLUTION IRRIG 500ML 0.9% SOD CHLO USP POUR PLAS BTL

## (undated) DEVICE — SUTURE VCRL + SZ 1 L36IN ABSRB UD L36MM CT-1 1/2 CIR VCP947H

## (undated) DEVICE — GLOVE SURG SZ 8 L12IN THK75MIL DK GRN LTX FREE

## (undated) DEVICE — BLUNTFILL: Brand: MONOJECT

## (undated) DEVICE — APPLICATOR MEDICATED 26 CC SOLUTION HI LT ORNG CHLORAPREP

## (undated) DEVICE — GOWN,SLEEVE,STERILE,W/CSR WRAP,1/P: Brand: MEDLINE

## (undated) DEVICE — ADHESIVE SKIN CLOSURE WND 8.661X1.5 IN 22 CM LIQUIBAND SECUR

## (undated) DEVICE — THE CANADY HYBRID PLASMA SCALPEL IS AN ELECTROSURGICAL PLASMA SCALPEL THAT USES AN 85MM BENDABLE PADDLE BLADE TIP. THE ELECTROSURGICAL PLASMA SCALPEL IS USED TO SIMULTANEOUSLY CUT AND COAGULATE BIOLOGICAL TISSUE.: Brand: CANADY HYBRID PLASMA PADDLE BLADE

## (undated) DEVICE — GOWN,SIRUS,NONRNF,SETINSLV,XL,20/CS: Brand: MEDLINE

## (undated) DEVICE — SOLUTION IV 1000ML LAC RINGERS PH 6.5 INJ USP VIAFLX PLAS

## (undated) DEVICE — ELECTRODE PT RET AD L9FT HI MOIST COND ADH HYDRGEL CORDED

## (undated) DEVICE — GLOVE SURG SZ 85 L12IN THK75MIL DK GRN LTX FREE

## (undated) DEVICE — GLOVE ORANGE PI 8   MSG9080

## (undated) DEVICE — GLOVE SURG SZ 75 L12IN FNGR THK79MIL GRN LTX FREE